# Patient Record
Sex: FEMALE | URBAN - METROPOLITAN AREA
[De-identification: names, ages, dates, MRNs, and addresses within clinical notes are randomized per-mention and may not be internally consistent; named-entity substitution may affect disease eponyms.]

---

## 2022-03-25 ENCOUNTER — NURSE TRIAGE (OUTPATIENT)
Dept: ADMINISTRATIVE | Facility: CLINIC | Age: 27
End: 2022-03-25

## 2022-03-26 NOTE — TELEPHONE ENCOUNTER
"Caller states she is 10 weeks pregnant and is currently having vaginal bleeding x 20 minutes. Caller denies severe or moderate pain or bleeding at this time. Caller also denies passing of blood clots.  Pt advised per protocol and verbalized understanding.    Reason for Disposition   MILD vaginal bleeding (e.g., < 1 pad / hour) or SPOTTING    Additional Information   Negative: Passed out (i.e., lost consciousness, collapsed and was not responding)   Negative: Shock suspected (e.g., cold/pale/clammy skin, too weak to stand, low BP, rapid pulse)   Negative: Difficult to awaken or acting confused (e.g., disoriented, slurred speech)   Negative: Sounds like a life-threatening emergency to the triager   Negative: MODERATE-SEVERE abdominal pain (e.g., interferes with normal activities, awakens from sleep)   Negative: [1] SEVERE vaginal bleeding (e.g., soaking 2 pads per hour, large blood clots) AND [2] present 2 or more hours   Negative: [1] MODERATE vaginal bleeding (e.g., soaking 1 pad per hour; clots) AND [2] present > 6 hours   Negative: [1] MODERATE vaginal bleeding (e.g., soaking 1 pad per hour; clots) AND [2] pregnant > 12 weeks   Negative: Passed tissue (e.g., gray-white)   Negative: [1] Vomiting AND [2] contains red blood or black ("coffee ground") material  (Exception: few red streaks in vomit that only happened once)   Negative: Shoulder pain   Negative: Lightheadedness or dizziness (e.g., feels like passing out)   Negative: Patient sounds very sick or weak to the triager   Negative: [1] Constant abdominal pain AND [2] present > 2 hours   Negative: Blood in urine (red, pink, or tea-colored)   Negative: Fever > 100.4 F (38.0 C)   Negative: White of the eyes have turned yellow (i.e., jaundice)   Negative: [1] Intermittent lower abdominal pain (e.g., cramping) AND [2] present > 24 hours    Protocols used: PREGNANCY - ABDOMINAL PAIN LESS THAN 20 WEEKS [id number]      "